# Patient Record
Sex: MALE | Race: OTHER | Employment: OTHER | URBAN - METROPOLITAN AREA
[De-identification: names, ages, dates, MRNs, and addresses within clinical notes are randomized per-mention and may not be internally consistent; named-entity substitution may affect disease eponyms.]

---

## 2019-08-28 NOTE — PATIENT DISCUSSION
** 1/20/20 EVON. Librado Pt needs tighter control of IOP abut declines adding another glc drop . Elects to proceed with the surgery option of I Stents at the time of cataract surgery . **.

## 2019-08-28 NOTE — PATIENT DISCUSSION
PLAN:  CE/IOL OS THEN OD +/- ISTENTS OU, GOAL BEHZAD, PT WANTS BASIC OU, PO NEXT DAY WITH OD IN SRQ, NO VAN, PT WILL [employer] DROPS AND CALL BACK.

## 2019-09-23 NOTE — PATIENT DISCUSSION
NEEDS CARDIAC MEDICAL CLEARANCE FROM  [facility].  HEART CATH IN LAST 6 MONTHS.

## 2019-09-23 NOTE — PATIENT DISCUSSION
NEEDS CARDIAC MEDICAL CLEARANCE FROM  [facility].  HEART CATH IN LAST 6 MONTHS.

## 2019-09-24 NOTE — PATIENT DISCUSSION
NEEDS CARDIAC MEDICAL CLEARANCE FROM  [facility].  HEART CATH IN LAST 6 MONTHS.

## 2019-09-30 NOTE — PATIENT DISCUSSION
NEEDS CARDIAC MEDICAL CLEARANCE FROM  [facility].  HEART CATH IN LAST 6 MONTHS.

## 2022-03-21 ENCOUNTER — NEW PATIENT (OUTPATIENT)
Dept: URBAN - METROPOLITAN AREA CLINIC 35 | Facility: CLINIC | Age: 62
End: 2022-03-21

## 2022-03-21 DIAGNOSIS — H52.4: ICD-10-CM

## 2022-03-21 DIAGNOSIS — H52.03: ICD-10-CM

## 2022-03-21 PROCEDURE — 92015 DETERMINE REFRACTIVE STATE: CPT

## 2022-03-21 PROCEDURE — 92004 COMPRE OPH EXAM NEW PT 1/>: CPT

## 2022-03-21 ASSESSMENT — VISUAL ACUITY
OD_CC: J2
OS_CC: J2
OD_CC: 20/20-1
OU_CC: J1
OS_CC: 20/25
OU_CC: 20/20 SQUINTING
OD_SC: 20/70+2
OS_SC: 20/50-1

## 2022-03-21 ASSESSMENT — KERATOMETRY
OD_AXISANGLE_DEGREES: 095
OS_AXISANGLE_DEGREES: 80
OS_AXISANGLE2_DEGREES: 170
OD_K1POWER_DIOPTERS: 43.25
OD_K2POWER_DIOPTERS: 43.50
OS_K1POWER_DIOPTERS: 43.50
OS_K2POWER_DIOPTERS: 44.25
OD_AXISANGLE2_DEGREES: 5

## 2022-03-21 ASSESSMENT — TONOMETRY
OS_IOP_MMHG: 16
OD_IOP_MMHG: 15

## 2023-11-28 ENCOUNTER — ESTABLISHED PATIENT (OUTPATIENT)
Dept: URBAN - METROPOLITAN AREA CLINIC 37 | Facility: CLINIC | Age: 63
End: 2023-11-28

## 2023-11-28 DIAGNOSIS — H52.03: ICD-10-CM

## 2023-11-28 DIAGNOSIS — H52.4: ICD-10-CM

## 2023-11-28 PROCEDURE — 92015 DETERMINE REFRACTIVE STATE: CPT

## 2023-11-28 PROCEDURE — 92014 COMPRE OPH EXAM EST PT 1/>: CPT

## 2023-11-28 ASSESSMENT — TONOMETRY
OS_IOP_MMHG: 12
OD_IOP_MMHG: 11

## 2023-11-28 ASSESSMENT — KERATOMETRY
OS_K2POWER_DIOPTERS: 44.25
OS_K1POWER_DIOPTERS: 43.50
OD_AXISANGLE_DEGREES: 095
OD_K2POWER_DIOPTERS: 43.50
OD_AXISANGLE2_DEGREES: 5
OD_K1POWER_DIOPTERS: 43.25
OS_AXISANGLE_DEGREES: 80
OS_AXISANGLE2_DEGREES: 170

## 2024-08-11 NOTE — PATIENT DISCUSSION
Discussed condition and exacerbating conditions/situations (e.g., dry/arid environments, overhead fans, air conditioners, side effect of medications). 11-Aug-2024 12:55